# Patient Record
Sex: FEMALE | Race: OTHER | URBAN - METROPOLITAN AREA
[De-identification: names, ages, dates, MRNs, and addresses within clinical notes are randomized per-mention and may not be internally consistent; named-entity substitution may affect disease eponyms.]

---

## 2024-10-30 ENCOUNTER — OFFICE VISIT (OUTPATIENT)
Dept: OBGYN CLINIC | Facility: CLINIC | Age: 44
End: 2024-10-30
Payer: COMMERCIAL

## 2024-10-30 VITALS — WEIGHT: 167 LBS | SYSTOLIC BLOOD PRESSURE: 122 MMHG | DIASTOLIC BLOOD PRESSURE: 80 MMHG

## 2024-10-30 DIAGNOSIS — R63.5 WEIGHT GAIN: ICD-10-CM

## 2024-10-30 DIAGNOSIS — Z01.419 ENCOUNTER FOR GYNECOLOGICAL EXAMINATION WITHOUT ABNORMAL FINDING: ICD-10-CM

## 2024-10-30 DIAGNOSIS — Z12.31 ENCOUNTER FOR SCREENING MAMMOGRAM FOR MALIGNANT NEOPLASM OF BREAST: Primary | ICD-10-CM

## 2024-10-30 DIAGNOSIS — R53.83 OTHER FATIGUE: ICD-10-CM

## 2024-10-30 PROCEDURE — 99386 PREV VISIT NEW AGE 40-64: CPT | Performed by: OBSTETRICS & GYNECOLOGY

## 2024-10-30 PROCEDURE — G0145 SCR C/V CYTO,THINLAYER,RESCR: HCPCS | Performed by: OBSTETRICS & GYNECOLOGY

## 2024-10-30 PROCEDURE — G0476 HPV COMBO ASSAY CA SCREEN: HCPCS | Performed by: OBSTETRICS & GYNECOLOGY

## 2024-10-30 NOTE — PROGRESS NOTES
Subjective      Love Briones is a 44 y.o. G1, P1 female who presents for new patient annual well woman exam. Periods are regular every 28-30 days, lasting 5 days, day 2-3 somewhat heavy generally well-controlled.  Patient is noting breast tenderness week before her cycle we discussed different options to treat.. No intermenstrual bleeding, spotting, or discharge.  Patient reports No hot flashes/night sweats, No pain problems intercourse, No vaginal dryness, sleeping fairly well only able to sleep about 4 to 6 hours but energy is generally okay  Patient had menarche at age 20 was very athletic did not necessarily have menses very regularly often with skip menses until she had pregnancy and delivered her son.  She gained about 60 pounds at that time and lost 40 of it.  Menses have been very regular since that time no significant issues.  LMP was October.    Current contraception: abstinence  History of abnormal Pap smear: no  Family history of uterine or ovarian cancer: no  Regular self breast exam: yes  History of abnormal mammogram: no  Family history of breast cancer: yes -maternal aunt  Maternal grandmother also had cancer unclear type    Menarche age: 20     History reviewed. No pertinent past medical history.  History reviewed. No pertinent surgical history.  OB History          1    Para   1    Term   1            AB        Living   1         SAB        IAB        Ectopic        Multiple        Live Births   1               Current Outpatient Medications   Medication Instructions    Multiple Vitamin (MULTIVITAMIN PO) 1 capsule, Oral, Daily     Allergies   Allergen Reactions    Erythromycin Other (See Comments)     Reaction as infant     Social History     Tobacco Use    Smoking status: Never    Smokeless tobacco: Never   Vaping Use    Vaping status: Never Used   Substance Use Topics    Alcohol use: Yes     Comment: occ    Drug use: Never       Review of Systems  Review of Systems    Constitutional: Negative.  Negative for chills and fever.   HENT: Negative.  Negative for ear pain and sore throat.    Eyes: Negative.  Negative for pain and visual disturbance.   Respiratory: Negative.  Negative for cough and shortness of breath.    Cardiovascular: Negative.  Negative for chest pain and palpitations.   Gastrointestinal: Negative.  Negative for abdominal pain and vomiting.   Genitourinary: Negative.  Negative for dysuria and hematuria.   Musculoskeletal: Negative.  Negative for arthralgias and back pain.   Skin: Negative.  Negative for color change and rash.   Neurological: Negative.  Negative for seizures and syncope.   All other systems reviewed and are negative.    Objective      /80 (BP Location: Left arm, Patient Position: Sitting, Cuff Size: Standard)   Wt 75.8 kg (167 lb)   LMP 09/09/2024 (Exact Date)   Vitals:    10/30/24 1506   BP: 122/80   BP Location: Left arm   Patient Position: Sitting   Cuff Size: Standard   Weight: 75.8 kg (167 lb)     General:   alert and oriented, in no acute distress   Heart: regular rate and rhythm, S1, S2 normal, no murmur, click, rub or gallop   Lungs: clear to auscultation bilaterally   Abdomen: soft, non-tender, without masses or organomegaly   Vulva: normal   Vagina: normal mucosa   Cervix: no bleeding following Pap, no cervical motion tenderness, and no lesions   Uterus: normal size, mobile, non-tender   Adnexa: normal adnexa and no mass, fullness, tenderness   Breast inspection negative, no nipple discharge or bleeding, no masses or nodularity palpable  Rectal negative, stool guaiac negative  Thyroid normal no masses or nodules     Assessment   44-year-old G1, P1 here for annual exam new patient.  Cycles relatively regular.  History of normal Pap smears.     Plan   ThinPrep with cotesting performed, given slip for mammogram, return in 1 year or sooner as needed

## 2024-10-31 LAB
HPV HR 12 DNA CVX QL NAA+PROBE: NEGATIVE
HPV16 DNA CVX QL NAA+PROBE: POSITIVE
HPV18 DNA CVX QL NAA+PROBE: NEGATIVE

## 2024-11-04 ENCOUNTER — TELEPHONE (OUTPATIENT)
Age: 44
End: 2024-11-04

## 2024-11-04 NOTE — TELEPHONE ENCOUNTER
Pt called in stating she saw her HPV results were abnormal and would like to review. Advised that her pap is still in process and Dr. Serrano has not yet reviewed results of HPV. Once results are reviewed, pt will be notified. Pt verbalized understanding and is thankful.

## 2024-11-05 LAB
LAB AP GYN PRIMARY INTERPRETATION: NORMAL
Lab: NORMAL

## 2024-11-05 NOTE — TELEPHONE ENCOUNTER
Spoke with patient and explained hpv and possible colposcopy. Patient aware waiting for pap results

## 2024-11-06 ENCOUNTER — TELEPHONE (OUTPATIENT)
Age: 44
End: 2024-11-06

## 2024-11-06 NOTE — TELEPHONE ENCOUNTER
RN placed a call to patient with update on results of pap and HPV. Educated on colpo procedure and provider's recommendations. Scheduled pt for next available colposcopy procedure with provider. No further questions.

## 2024-11-06 NOTE — TELEPHONE ENCOUNTER
----- Message from Joanna Serrano MD sent at 11/6/2024  9:46 AM EST -----  Please call the patient regarding her abnormal result.  Please let her know that her Pap smear came back with normal cells.  Her HPV 16 is positive so I would recommend she schedule colposcopy for further evaluation and should take Motrin prior to procedure

## 2024-11-13 ENCOUNTER — PROCEDURE VISIT (OUTPATIENT)
Dept: OBGYN CLINIC | Facility: CLINIC | Age: 44
End: 2024-11-13
Payer: COMMERCIAL

## 2024-11-13 VITALS — WEIGHT: 168 LBS | DIASTOLIC BLOOD PRESSURE: 86 MMHG | SYSTOLIC BLOOD PRESSURE: 124 MMHG

## 2024-11-13 DIAGNOSIS — Z32.02 NEGATIVE PREGNANCY TEST: ICD-10-CM

## 2024-11-13 DIAGNOSIS — N72 HIGH RISK HUMAN PAPILLOMA VIRUS (HPV) INFECTION OF CERVIX: Primary | ICD-10-CM

## 2024-11-13 DIAGNOSIS — B97.7 HIGH RISK HUMAN PAPILLOMA VIRUS (HPV) INFECTION OF CERVIX: Primary | ICD-10-CM

## 2024-11-13 LAB — SL AMB POCT URINE HCG: NEGATIVE

## 2024-11-13 PROCEDURE — 81025 URINE PREGNANCY TEST: CPT | Performed by: OBSTETRICS & GYNECOLOGY

## 2024-11-13 PROCEDURE — 88305 TISSUE EXAM BY PATHOLOGIST: CPT | Performed by: PATHOLOGY

## 2024-11-13 PROCEDURE — 57456 ENDOCERV CURETTAGE W/SCOPE: CPT | Performed by: OBSTETRICS & GYNECOLOGY

## 2024-11-13 NOTE — PROGRESS NOTES
Subjective     Love Briones is a 44 y.o. G1, P1 female here for a problem visit.    Patient was here for annual exam  and had Pap smear with normal cells but positive high risk HPV 16.  This is patient's first abnormal Pap smear.  Discussed and reviewed HPV patient's first partner in her youth cheated on her and may be source of infection.  Patient had coitarche at age 23 and 3 lifetime partners.  No history of tobacco but her mother is a heavy smoker so secondhand exposure no history of STD her child is age 5 no other family history of abnormal Pap or cancer.  Patient thinks she had Gardasil series.     Gynecologic History  Patient's last menstrual period was 2024 (exact date).  Contraception: abstinence  Last Pap: 2024. Results were: Normal cells positive high risk HPV 16  Last mammogram: Ordered    Obstetric History  OB History    Para Term  AB Living   1 1 1   1   SAB IAB Ectopic Multiple Live Births       1      # Outcome Date GA Lbr Jamar/2nd Weight Sex Type Anes PTL Lv   1 Term     M Vag-Spont   NEGRA         The following portions of the patient's history were reviewed and updated as appropriate: allergies, current medications, past family history, past medical history, past social history, past surgical history, and problem list.    Review of Systems  Review of Systems   Constitutional:  Negative for chills and fever.   HENT:  Negative for ear pain and sore throat.    Eyes:  Negative for pain and visual disturbance.   Respiratory:  Negative for cough and shortness of breath.    Cardiovascular:  Negative for chest pain and palpitations.   Gastrointestinal:  Negative for abdominal pain and vomiting.   Genitourinary:  Negative for dysuria and hematuria.   Musculoskeletal:  Negative for arthralgias and back pain.   Skin:  Negative for color change and rash.   Neurological:  Negative for seizures and syncope.   All other systems reviewed and are negative.      "  Objective     /86 (BP Location: Left arm, Patient Position: Sitting, Cuff Size: Standard)   Wt 76.2 kg (168 lb)   LMP 11/01/2024 (Exact Date)   General appearance: alert and oriented, in no acute distress  Head: Normocephalic, without obvious abnormality, atraumatic  Pelvic: External female genitalia normal, vagina without discharge, cervix normal in appearance and nabothian cyst is noted  Extremities: extremities normal, warm and well-perfused; no cyanosis, clubbing, or edema    Colposcopy    Date/Time: 11/13/2024 2:30 PM    Performed by: Joanna Serrano MD  Authorized by: Joanna Serrano MD    Verbal consent obtained?: Yes    Risks and benefits: Risks, benefits and alternatives were discussed    Consent given by:  Patient  Patient states understanding of procedure being performed: Yes    Patient identity confirmed:  Verbally with patient  Pre-procedure:     Prepped with: acetic acid    Procedure:     Procedure: Colposcopy w/ endocervical curettage      Under satisfactory analgesia the patient was prepped and draped in the dorsal lithotomy position: yes      Duryea speculum was placed in the vagina: yes      Under colposcopic examination the transition zone was seen in entirety: yes      Endocervix was curetted using a Kevorkian curette: yes      Specimen(s) to pathology: yes    Comments:      Cervix and vagina cleansed with acetic a nabothian cyst is noted on the cervix no other findings SCJ right at external os fully visualized ECC obtained additional specimen collected with Cytobrush.  Patient tolerated well    Assessment  44-year-old G1, P1 with positive high risk HPV Pap smear here for colposcopy.  At patient's request fully counseled on \"worst case scenario\" discussed possibilities and options.     Plan  Return based on results  "

## 2024-11-18 ENCOUNTER — RESULTS FOLLOW-UP (OUTPATIENT)
Dept: OBGYN CLINIC | Facility: CLINIC | Age: 44
End: 2024-11-18

## 2024-11-18 PROCEDURE — 88305 TISSUE EXAM BY PATHOLOGIST: CPT | Performed by: PATHOLOGY
